# Patient Record
Sex: FEMALE | NOT HISPANIC OR LATINO | Employment: OTHER | URBAN - METROPOLITAN AREA
[De-identification: names, ages, dates, MRNs, and addresses within clinical notes are randomized per-mention and may not be internally consistent; named-entity substitution may affect disease eponyms.]

---

## 2024-08-15 ENCOUNTER — TELEPHONE (OUTPATIENT)
Dept: UROLOGY | Facility: CLINIC | Age: 84
End: 2024-08-15
Payer: MEDICARE

## 2024-08-15 NOTE — TELEPHONE ENCOUNTER
.Outgoing call placed to patient, patient verified name and date of birth, patient notified that she is scheduled for the soonest appt with Dr. Lipscomb but if she is interested she can see our NP or another provider sooner, she stated she will contact her daughter and see what she says. I contacted the below daughter and she was notified of the options and stated she will talk with her mom and contact us back.      ----- Message from Natalie Myers sent at 8/15/2024 10:04 AM CDT -----  Contact: Debbie  Type:  Sooner Appointment Request    Caller is requesting a sooner appointment.  Caller declined first available appointment listed below.  Caller will not accept being placed on the waitlist and is requesting a message be sent to doctor.    Name of Caller:  Debbie/ Daughter/ Caregiver  When is the first available appointment?  9/30  Symptoms:  Symptom: Urination Pain  Outcome: Schedule a same-day appointment or talk to a nurse or provider within 1 hour.  Reason: Caller denied all higher acuity questions    The caller rejected this outcome  Would the patient rather a call back or a response via [facility]? Call back  Best Call Back Number:  [phone]  Additional Information:  Would like to be seen in [facility]

## 2024-08-24 ENCOUNTER — OFFICE VISIT (OUTPATIENT)
Dept: UROLOGY | Facility: CLINIC | Age: 84
End: 2024-08-24
Payer: MEDICARE

## 2024-08-24 VITALS
HEART RATE: 66 BPM | WEIGHT: 132.06 LBS | SYSTOLIC BLOOD PRESSURE: 151 MMHG | RESPIRATION RATE: 18 BRPM | DIASTOLIC BLOOD PRESSURE: 64 MMHG

## 2024-08-24 DIAGNOSIS — R30.0 DYSURIA: Primary | ICD-10-CM

## 2024-08-24 DIAGNOSIS — R35.1 NOCTURIA: ICD-10-CM

## 2024-08-24 DIAGNOSIS — N39.0 RECURRENT UTI: ICD-10-CM

## 2024-08-24 DIAGNOSIS — N95.8 GENITOURINARY SYNDROME OF MENOPAUSE: ICD-10-CM

## 2024-08-24 LAB
BILIRUB UR QL STRIP: NEGATIVE
GLUCOSE UR QL STRIP: NEGATIVE
KETONES UR QL STRIP: NEGATIVE
LEUKOCYTE ESTERASE UR QL STRIP: POSITIVE
PH, POC UA: 6.5
POC BLOOD, URINE: NEGATIVE
POC NITRATES, URINE: POSITIVE
PROT UR QL STRIP: NEGATIVE
SP GR UR STRIP: 1.02 (ref 1–1.03)
UROBILINOGEN UR STRIP-ACNC: 0.2 (ref 0.1–1.1)

## 2024-08-24 PROCEDURE — 81003 URINALYSIS AUTO W/O SCOPE: CPT | Mod: QW,S$GLB,, | Performed by: UROLOGY

## 2024-08-24 PROCEDURE — 1101F PT FALLS ASSESS-DOCD LE1/YR: CPT | Mod: CPTII,S$GLB,, | Performed by: UROLOGY

## 2024-08-24 PROCEDURE — 1159F MED LIST DOCD IN RCRD: CPT | Mod: CPTII,S$GLB,, | Performed by: UROLOGY

## 2024-08-24 PROCEDURE — 1126F AMNT PAIN NOTED NONE PRSNT: CPT | Mod: CPTII,S$GLB,, | Performed by: UROLOGY

## 2024-08-24 PROCEDURE — 3288F FALL RISK ASSESSMENT DOCD: CPT | Mod: CPTII,S$GLB,, | Performed by: UROLOGY

## 2024-08-24 PROCEDURE — 87086 URINE CULTURE/COLONY COUNT: CPT | Performed by: UROLOGY

## 2024-08-24 PROCEDURE — 99204 OFFICE O/P NEW MOD 45 MIN: CPT | Mod: S$GLB,,, | Performed by: UROLOGY

## 2024-08-24 PROCEDURE — 87186 SC STD MICRODIL/AGAR DIL: CPT | Performed by: UROLOGY

## 2024-08-24 PROCEDURE — 99999 PR PBB SHADOW E&M-EST. PATIENT-LVL IV: CPT | Mod: PBBFAC,,, | Performed by: UROLOGY

## 2024-08-24 PROCEDURE — 87077 CULTURE AEROBIC IDENTIFY: CPT | Performed by: UROLOGY

## 2024-08-24 PROCEDURE — 87088 URINE BACTERIA CULTURE: CPT | Performed by: UROLOGY

## 2024-08-24 RX ORDER — FENOFIBRATE 160 MG/1
1 TABLET ORAL DAILY
COMMUNITY
Start: 2023-10-23

## 2024-08-24 RX ORDER — AMLODIPINE BESYLATE 10 MG/1
1 TABLET ORAL DAILY
COMMUNITY
Start: 2024-04-18

## 2024-08-24 RX ORDER — LOSARTAN POTASSIUM 100 MG/1
1 TABLET ORAL DAILY
COMMUNITY
Start: 2024-05-28

## 2024-08-24 RX ORDER — BUSPIRONE HYDROCHLORIDE 5 MG/1
5 TABLET ORAL
COMMUNITY
Start: 2023-10-10

## 2024-08-24 RX ORDER — NITROFURANTOIN 25; 75 MG/1; MG/1
CAPSULE ORAL
COMMUNITY
Start: 2024-08-16

## 2024-08-24 RX ORDER — ASCORBIC ACID 125 MG
TABLET,CHEWABLE ORAL
COMMUNITY

## 2024-08-24 RX ORDER — CEFDINIR 300 MG/1
300 CAPSULE ORAL 2 TIMES DAILY
Qty: 14 CAPSULE | Refills: 0 | Status: SHIPPED | OUTPATIENT
Start: 2024-08-24 | End: 2024-08-31

## 2024-08-24 RX ORDER — ASPIRIN 81 MG/1
1 TABLET ORAL EVERY MORNING
COMMUNITY

## 2024-08-24 RX ORDER — METOPROLOL SUCCINATE 25 MG/1
1 TABLET, EXTENDED RELEASE ORAL EVERY MORNING
COMMUNITY
Start: 2024-04-18

## 2024-08-24 NOTE — PROGRESS NOTES
Chief Complaint   Patient presents with    Dysuria       Referring Provider: Dr. Anum Shirley      History of Present Illness:   Windy Mariano is a 84 y.o. female here for evaluation of Dysuria  .   [birth year]/24-85yo female presents for evaluation of recurrent UTI and nocturia. She reports that she has nocturia x 10-17 times a night. She denies daytime frequency. She reports that she has UTIs about 4 or more times this past year. There are multiple E. Coli cultures on file. Nocturia has also been ongoing for about a year. She hasn't taken any oab meds. She just finished 5 days of macrobid 5 days ago. She does report having some right sided flank pain. She denies snoring.       Review of Systems   Constitutional:  Negative for chills and fever.   Respiratory:  Negative for shortness of breath.    Cardiovascular:  Negative for chest pain and leg swelling.   Genitourinary:  Positive for flank pain.   All other systems reviewed and are negative.        Past Medical History:   Diagnosis Date    Allergy     Hypertension        Past Surgical History:   Procedure Laterality Date    AORTIC VALVE REPLACEMENT      HYSTERECTOMY         Family History   Problem Relation Name Age of Onset    Hypertension Mother         Social History     Tobacco Use    Smoking status: Never    Smokeless tobacco: Never   Substance Use Topics    Alcohol use: Never    Drug use: Never       Current Outpatient Medications   Medication Sig Dispense Refill    amLODIPine (NORVASC) 10 MG tablet Take 1 tablet by mouth once daily.      aspirin (ECOTRIN) 81 MG EC tablet Take 1 tablet by mouth every morning.      busPIRone (BUSPAR) 5 MG Tab Take 5 mg by mouth.      cholecalciferol, vitamin D3, 25 mcg (1,000 unit) Chew Take by mouth.      fenofibrate 160 MG Tab Take 1 tablet by mouth once daily.      losartan (COZAAR) 100 MG tablet Take 1 tablet by mouth once daily.      metoprolol succinate (TOPROL-XL) 25 MG 24 hr tablet Take 1 tablet by mouth every  morning.      nitrofurantoin, macrocrystal-monohydrate, (MACROBID) 100 MG capsule SMARTSI Capsule(s) By Mouth Morning-Night      cefdinir (OMNICEF) 300 MG capsule Take 1 capsule (300 mg total) by mouth 2 (two) times daily. for 7 days 14 capsule 0     No current facility-administered medications for this visit.       Review of patient's allergies indicates:   Allergen Reactions    Sulfa (sulfonamide antibiotics)        Physical Exam  Vitals:    24 1011   BP: (!) 151/64   Pulse: 66   Resp: 18     General: Well-developed, well-nourished, in no acute distress  HEENT: Normocephalic, atraumatic, extraocular movements intact  Neck: Supple, no supraclavicular or cervical lymphadenopathy, trachea midline  Respirations: even and unlabored  Back: midline spine, No CVA tenderness  Abdomen: soft, Non-tender, non-distended, no palpable masses, no rebound or guarding  : Normal external female genitalia without lesions. Orthotopic urethral meatus. atrophic vaginal mucosa. Grade 2 Cysotcele, no Rectocele, negative cough stress test, + urethral hypermobility  Extremities: moves all equally, no clubbing, cyanosis or edema  Skin: Warm and dry. No lesions  Psych: normal affect  Neuro: Alert and oriented x 3. Cranial nerves II-XII intact      Urinalysis  Nit+, trace LE    Assessment:  1. Dysuria  POCT Urinalysis, Dipstick, Automated, W/O Scope      2. Nocturia        3. Recurrent UTI  US Retroperitoneal Complete    Urine culture      4. Genitourinary syndrome of menopause              Plan:   Dysuria  -     POCT Urinalysis, Dipstick, Automated, W/O Scope    Nocturia    Recurrent UTI  -     US Retroperitoneal Complete; Future; Expected date: 2024  -     Urine culture    Genitourinary syndrome of menopause    Other orders  -     cefdinir (OMNICEF) 300 MG capsule; Take 1 capsule (300 mg total) by mouth 2 (two) times daily. for 7 days  Dispense: 14 capsule; Refill: 0      Discussed vaginal estrogen. Pt to hold off until  after workup.     Follow up for Cystoscopy.

## 2024-08-28 LAB — BACTERIA UR CULT: ABNORMAL

## 2024-09-09 ENCOUNTER — OFFICE VISIT (OUTPATIENT)
Dept: UROLOGY | Facility: CLINIC | Age: 84
End: 2024-09-09
Payer: MEDICARE

## 2024-09-09 ENCOUNTER — PATIENT MESSAGE (OUTPATIENT)
Dept: UROLOGY | Facility: CLINIC | Age: 84
End: 2024-09-09

## 2024-09-09 VITALS
WEIGHT: 128.75 LBS | DIASTOLIC BLOOD PRESSURE: 62 MMHG | HEART RATE: 69 BPM | BODY MASS INDEX: 25.28 KG/M2 | TEMPERATURE: 98 F | HEIGHT: 60 IN | SYSTOLIC BLOOD PRESSURE: 153 MMHG | RESPIRATION RATE: 18 BRPM

## 2024-09-09 DIAGNOSIS — R30.0 DYSURIA: Primary | ICD-10-CM

## 2024-09-09 DIAGNOSIS — N95.8 GENITOURINARY SYNDROME OF MENOPAUSE: ICD-10-CM

## 2024-09-09 DIAGNOSIS — K59.09 CHRONIC CONSTIPATION: ICD-10-CM

## 2024-09-09 DIAGNOSIS — N39.0 RECURRENT UTI: ICD-10-CM

## 2024-09-09 LAB
BILIRUB UR QL STRIP: POSITIVE
GLUCOSE UR QL STRIP: POSITIVE
KETONES UR QL STRIP: POSITIVE
LEUKOCYTE ESTERASE UR QL STRIP: POSITIVE
PH, POC UA: 5
POC BLOOD, URINE: NEGATIVE
POC NITRATES, URINE: POSITIVE
POC RESIDUAL URINE VOLUME: 21 ML (ref 0–100)
PROT UR QL STRIP: POSITIVE
SP GR UR STRIP: 1.02 (ref 1–1.03)
UROBILINOGEN UR STRIP-ACNC: 2 (ref 0.1–1.1)

## 2024-09-09 PROCEDURE — 99214 OFFICE O/P EST MOD 30 MIN: CPT | Mod: S$GLB,,, | Performed by: UROLOGY

## 2024-09-09 PROCEDURE — 99999 PR PBB SHADOW E&M-EST. PATIENT-LVL IV: CPT | Mod: PBBFAC,,, | Performed by: UROLOGY

## 2024-09-09 PROCEDURE — 1101F PT FALLS ASSESS-DOCD LE1/YR: CPT | Mod: CPTII,S$GLB,, | Performed by: UROLOGY

## 2024-09-09 PROCEDURE — 3288F FALL RISK ASSESSMENT DOCD: CPT | Mod: CPTII,S$GLB,, | Performed by: UROLOGY

## 2024-09-09 PROCEDURE — 87186 SC STD MICRODIL/AGAR DIL: CPT | Performed by: UROLOGY

## 2024-09-09 PROCEDURE — 87086 URINE CULTURE/COLONY COUNT: CPT | Performed by: UROLOGY

## 2024-09-09 PROCEDURE — 1126F AMNT PAIN NOTED NONE PRSNT: CPT | Mod: CPTII,S$GLB,, | Performed by: UROLOGY

## 2024-09-09 PROCEDURE — 51798 US URINE CAPACITY MEASURE: CPT | Mod: S$GLB,,, | Performed by: UROLOGY

## 2024-09-09 PROCEDURE — 81003 URINALYSIS AUTO W/O SCOPE: CPT | Mod: QW,S$GLB,, | Performed by: UROLOGY

## 2024-09-09 PROCEDURE — 1159F MED LIST DOCD IN RCRD: CPT | Mod: CPTII,S$GLB,, | Performed by: UROLOGY

## 2024-09-09 PROCEDURE — 87088 URINE BACTERIA CULTURE: CPT | Performed by: UROLOGY

## 2024-09-09 RX ORDER — ESTRADIOL 0.1 MG/G
CREAM VAGINAL
Qty: 42.5 G | Refills: 3 | Status: SHIPPED | OUTPATIENT
Start: 2024-09-09 | End: 2025-09-11

## 2024-09-09 RX ORDER — NITROFURANTOIN 25; 75 MG/1; MG/1
100 CAPSULE ORAL 2 TIMES DAILY
Qty: 14 CAPSULE | Refills: 0 | Status: SHIPPED | OUTPATIENT
Start: 2024-09-09 | End: 2024-09-12

## 2024-09-09 NOTE — PROGRESS NOTES
Chief Complaint   Patient presents with    Urinary Tract Infection       History of Present Illness:   Windy Mariano is a 84 y.o. female here for evaluation of Urinary Tract Infection  .   9/9/24-Pt completed cefdinir for UTI about 10 days ago. She did have improvement and only had nocturia x 6-7. Symptoms restarted about 3 days ago, she started on azo. Last night, she had nocturia x 13. She reports that prior to starting the azo, she was having dysuria and pressure that she reports radiates up the sides of her abdomen. No fever. Occasional right flank pain. Pt reports constipation. Unsure if she started Miralax. No gross hematuria.     8/24/24-83yo female presents for evaluation of recurrent UTI and nocturia. She reports that she has nocturia x 10-17 times a night. She denies daytime frequency. She reports that she has UTIs about 4 or more times this past year. There are multiple E. Coli cultures on file. Nocturia has also been ongoing for about a year. She hasn't taken any oab meds. She just finished 5 days of macrobid 5 days ago. She does report having some right sided flank pain. She denies snoring.       Review of Systems   Constitutional:  Negative for chills and fever.   Respiratory:  Negative for shortness of breath.    Cardiovascular:  Negative for chest pain and leg swelling.   Gastrointestinal:  Positive for constipation.   Genitourinary:  Positive for flank pain.   Musculoskeletal:  Positive for arthralgias.   All other systems reviewed and are negative.        Past Medical History:   Diagnosis Date    Allergy     Hypertension        Past Surgical History:   Procedure Laterality Date    AORTIC VALVE REPLACEMENT      HYSTERECTOMY         Family History   Problem Relation Name Age of Onset    Hypertension Mother         Social History     Tobacco Use    Smoking status: Never    Smokeless tobacco: Never   Substance Use Topics    Alcohol use: Never    Drug use: Never       Current Outpatient Medications    Medication Sig Dispense Refill    amLODIPine (NORVASC) 10 MG tablet Take 1 tablet by mouth once daily.      aspirin (ECOTRIN) 81 MG EC tablet Take 1 tablet by mouth every morning.      busPIRone (BUSPAR) 5 MG Tab Take 5 mg by mouth.      cholecalciferol, vitamin D3, 25 mcg (1,000 unit) Chew Take by mouth.      fenofibrate 160 MG Tab Take 1 tablet by mouth once daily.      losartan (COZAAR) 100 MG tablet Take 1 tablet by mouth once daily.      metoprolol succinate (TOPROL-XL) 25 MG 24 hr tablet Take 1 tablet by mouth every morning.      nitrofurantoin, macrocrystal-monohydrate, (MACROBID) 100 MG capsule SMARTSI Capsule(s) By Mouth Morning-Night      estradioL (ESTRACE) 0.01 % (0.1 mg/gram) vaginal cream Place 1 g vaginally once daily for 7 days, THEN 1 g twice a week. 42.5 g 3    nitrofurantoin, macrocrystal-monohydrate, (MACROBID) 100 MG capsule Take 1 capsule (100 mg total) by mouth 2 (two) times daily. 14 capsule 0     No current facility-administered medications for this visit.       Review of patient's allergies indicates:   Allergen Reactions    Sulfa (sulfonamide antibiotics)        Physical Exam  Vitals:    24 1104   BP: (!) 153/62   Pulse: 69   Resp: 18   Temp: 98.1 °F (36.7 °C)     General: Well-developed, well-nourished, in no acute distress  HEENT: Normocephalic, atraumatic, extraocular movements intact  Neck: Supple, no supraclavicular or cervical lymphadenopathy, trachea midline  Respirations: even and unlabored  Back: midline spine, No CVA tenderness  Abdomen: soft, Non-tender, non-distended, no palpable masses, no rebound or guarding  : 24-Normal external female genitalia without lesions. Orthotopic urethral meatus. atrophic vaginal mucosa. Grade 2 Cysotcele, no Rectocele, negative cough stress test, + urethral hypermobility  Extremities: moves all equally, no clubbing, cyanosis or edema  Skin: Warm and dry. No lesions  Psych: normal affect  Neuro: Alert and oriented x 3. Cranial  nerves II-XII intact      Urinalysis  Pyridium urine    Assessment:  1. Dysuria  POCT Urinalysis, Dipstick, Automated, W/O Scope    POCT Bladder Scan    Urine culture      2. Recurrent UTI        3. Genitourinary syndrome of menopause        4. Chronic constipation              Plan:   Dysuria  -     POCT Urinalysis, Dipstick, Automated, W/O Scope  -     POCT Bladder Scan  -     Urine culture    Recurrent UTI    Genitourinary syndrome of menopause    Chronic constipation    Other orders  -     estradioL (ESTRACE) 0.01 % (0.1 mg/gram) vaginal cream; Place 1 g vaginally once daily for 7 days, THEN 1 g twice a week.  Dispense: 42.5 g; Refill: 3  -     nitrofurantoin, macrocrystal-monohydrate, (MACROBID) 100 MG capsule; Take 1 capsule (100 mg total) by mouth 2 (two) times daily.  Dispense: 14 capsule; Refill: 0      Encouraged use of MiraLax.     Follow up for Cystoscopy.

## 2024-09-12 ENCOUNTER — TELEPHONE (OUTPATIENT)
Dept: UROLOGY | Facility: CLINIC | Age: 84
End: 2024-09-12
Payer: MEDICARE

## 2024-09-12 RX ORDER — LEVOFLOXACIN 500 MG/1
500 TABLET, FILM COATED ORAL DAILY
Qty: 7 TABLET | Refills: 0 | Status: SHIPPED | OUTPATIENT
Start: 2024-09-12

## 2024-09-12 NOTE — TELEPHONE ENCOUNTER
.Outgoing call placed to patient, patient verified name and date of birth, patient notified of below slowly and so she is able to understand, she verbalized stopping the current antibiotic and getting her daughter to  the new one from the pharmacy, no further assistance.       ----- Message from Lizabeth Lipscomb MD sent at 9/12/2024 10:05 AM CDT -----  Notify pt based on the preliminary culture, macrobid probably won't work. Levaquin sent to her pharmacy to take instead.

## 2024-09-12 NOTE — TELEPHONE ENCOUNTER
.Outgoing call placed to patient's daughter, she verified patient's name and date of birth, she stated patient was concerned about whether this medication would be an issue since she has aortic valve stenosis, message sent to MD with patient's concerns.      ----- Message from Yoandy Ford sent at 9/12/2024  3:41 PM CDT -----  Contact: Windy  Type:  Needs Medical Advice    Who Called: Windy  Symptoms (please be specific): questions about the levoFLOXacin (LEVAQUIN) 500 MG tablet medication  Would the patient rather a call back or a response via [facility]? call  Best Call Back Number: [phone]   Additional Information:

## 2024-09-12 NOTE — TELEPHONE ENCOUNTER
Spoke with patient's daughter who was notified of below per Dr. Lipscomb, she verbalized understanding and will speak with her mom about it, if she has any other questions/concerns she will contact us. Encouraged for her and mom to monitor of adverse effects and stop medication immediately if needed    ----- Message from Lizabeth Lipscomb MD sent at 9/12/2024  4:28 PM CDT -----  Contact: Windy  If she wants to wait, she can, but with the type of bacteria that is growing, that may be the only oral option. We do have to exercise caution because there are potential side effects, so if she would like to shorten the course to 5 days, that will probably be okay.  ----- Message -----  From: Rickey Winters RN  Sent: 9/12/2024   3:59 PM CDT  To: Lizabeth Lipscomb MD    Patient and daughter notified of antibiotic change and wanted to let you know that she has aortic valve stenosis and wasn't sure if the Levofloxacin would be an issue for her after reading side effects.  ----- Message -----  From: Yoandy Ford  Sent: 9/12/2024   3:42 PM CDT  To: [facility]    Type:  Needs Medical Advice    Who Called: Windy  Symptoms (please be specific): questions about the levoFLOXacin (LEVAQUIN) 500 MG tablet medication  Would the patient rather a call back or a response via [facility]? call  Best Call Back Number: [phone]   Additional Information:

## 2024-09-13 LAB — BACTERIA UR CULT: ABNORMAL

## 2024-09-20 ENCOUNTER — HOSPITAL ENCOUNTER (OUTPATIENT)
Dept: RADIOLOGY | Facility: HOSPITAL | Age: 84
Discharge: HOME | End: 2024-09-20
Attending: UROLOGY
Payer: MEDICARE

## 2024-09-20 DIAGNOSIS — N39.0 RECURRENT UTI: ICD-10-CM

## 2024-09-20 PROCEDURE — 76770 US EXAM ABDO BACK WALL COMP: CPT | Mod: 26,,, | Performed by: RADIOLOGY

## 2024-09-20 PROCEDURE — 76770 US EXAM ABDO BACK WALL COMP: CPT | Mod: TC

## 2024-09-23 ENCOUNTER — TELEPHONE (OUTPATIENT)
Dept: UROLOGY | Facility: CLINIC | Age: 84
End: 2024-09-23

## 2024-09-23 ENCOUNTER — PATIENT MESSAGE (OUTPATIENT)
Dept: UROLOGY | Facility: CLINIC | Age: 84
End: 2024-09-23

## 2024-09-23 ENCOUNTER — PROCEDURE VISIT (OUTPATIENT)
Dept: UROLOGY | Facility: CLINIC | Age: 84
End: 2024-09-23
Payer: MEDICARE

## 2024-09-23 DIAGNOSIS — R35.1 NOCTURIA: ICD-10-CM

## 2024-09-23 DIAGNOSIS — N39.0 RECURRENT UTI: Primary | ICD-10-CM

## 2024-09-23 DIAGNOSIS — N30.80 CYSTITIS CYSTICA: ICD-10-CM

## 2024-09-23 LAB
BILIRUB UR QL STRIP: NEGATIVE
GLUCOSE UR QL STRIP: NEGATIVE
KETONES UR QL STRIP: POSITIVE
LEUKOCYTE ESTERASE UR QL STRIP: NEGATIVE
PH, POC UA: 5.5
POC BLOOD, URINE: NEGATIVE
POC NITRATES, URINE: NEGATIVE
PROT UR QL STRIP: NEGATIVE
SP GR UR STRIP: 1.02 (ref 1–1.03)
UROBILINOGEN UR STRIP-ACNC: 0.2 (ref 0.1–1.1)

## 2024-09-23 PROCEDURE — 81003 URINALYSIS AUTO W/O SCOPE: CPT | Mod: QW,S$GLB,, | Performed by: UROLOGY

## 2024-09-23 PROCEDURE — 52000 CYSTOURETHROSCOPY: CPT | Mod: S$GLB,,, | Performed by: UROLOGY

## 2024-09-23 RX ORDER — NITROFURANTOIN 25; 75 MG/1; MG/1
100 CAPSULE ORAL DAILY
Qty: 30 CAPSULE | Refills: 2 | Status: SHIPPED | OUTPATIENT
Start: 2024-09-23

## 2024-09-23 RX ORDER — LIDOCAINE HYDROCHLORIDE 20 MG/ML
JELLY TOPICAL
Status: COMPLETED | OUTPATIENT
Start: 2024-09-23 | End: 2024-09-23

## 2024-09-23 RX ORDER — CIPROFLOXACIN 500 MG/1
500 TABLET ORAL
Status: COMPLETED | OUTPATIENT
Start: 2024-09-23 | End: 2024-09-23

## 2024-09-23 RX ADMIN — CIPROFLOXACIN 500 MG: 500 TABLET ORAL at 10:09

## 2024-09-23 RX ADMIN — LIDOCAINE HYDROCHLORIDE 11 ML: 20 JELLY TOPICAL at 10:09

## 2024-09-23 NOTE — PROCEDURES
CC: follow up recurrent UTI    History of Present Illness:   Windy Mariano is a 84 y.o. female here for follow up.     9/23/24-Here for cysto. Pt uses estrace cream. Renal US showed simple renal cysts only. Completed levaquin for most recent UTI. She continues to have significant nocturia. No snoring.    9/9/24-Pt completed cefdinir for UTI about 10 days ago. She did have improvement and only had nocturia x 6-7. Symptoms restarted about 3 days ago, she started on azo. Last night, she had nocturia x 13. She reports that prior to starting the azo, she was having dysuria and pressure that she reports radiates up the sides of her abdomen. No fever. Occasional right flank pain. Pt reports constipation. Unsure if she started Miralax. No gross hematuria.     8/24/24-83yo female presents for evaluation of recurrent UTI and nocturia. She reports that she has nocturia x 10-17 times a night. She denies daytime frequency. She reports that she has UTIs about 4 or more times this past year. There are multiple E. Coli cultures on file. Nocturia has also been ongoing for about a year. She hasn't taken any oab meds. She just finished 5 days of macrobid 5 days ago. She does report having some right sided flank pain. She denies snoring.       Review of Systems   Constitutional:  Negative for chills and fever.   Respiratory:  Negative for shortness of breath.    Cardiovascular:  Negative for chest pain and leg swelling.   Gastrointestinal:  Positive for constipation.   Genitourinary:  Positive for flank pain.   Musculoskeletal:  Positive for arthralgias.   All other systems reviewed and are negative.        Past Medical History:   Diagnosis Date    Allergy     Hypertension        Past Surgical History:   Procedure Laterality Date    AORTIC VALVE REPLACEMENT      HYSTERECTOMY         Family History   Problem Relation Name Age of Onset    Hypertension Mother         Social History     Tobacco Use    Smoking status: Never    Smokeless  tobacco: Never   Substance Use Topics    Alcohol use: Never    Drug use: Never       Current Outpatient Medications   Medication Sig Dispense Refill    amLODIPine (NORVASC) 10 MG tablet Take 1 tablet by mouth once daily.      aspirin (ECOTRIN) 81 MG EC tablet Take 1 tablet by mouth every morning.      busPIRone (BUSPAR) 5 MG Tab Take 5 mg by mouth.      cholecalciferol, vitamin D3, 25 mcg (1,000 unit) Chew Take by mouth.      estradioL (ESTRACE) 0.01 % (0.1 mg/gram) vaginal cream Place 1 g vaginally once daily for 7 days, THEN 1 g twice a week. 42.5 g 3    fenofibrate 160 MG Tab Take 1 tablet by mouth once daily.      levoFLOXacin (LEVAQUIN) 500 MG tablet Take 1 tablet (500 mg total) by mouth once daily. 7 tablet 0    losartan (COZAAR) 100 MG tablet Take 1 tablet by mouth once daily.      metoprolol succinate (TOPROL-XL) 25 MG 24 hr tablet Take 1 tablet by mouth every morning.      nitrofurantoin, macrocrystal-monohydrate, (MACROBID) 100 MG capsule Take 1 capsule (100 mg total) by mouth Daily. 30 capsule 2     No current facility-administered medications for this visit.       Review of patient's allergies indicates:   Allergen Reactions    Sulfa (sulfonamide antibiotics)        Physical Exam  There were no vitals filed for this visit.    General: Well-developed, well-nourished, in no acute distress  HEENT: Normocephalic, atraumatic, extraocular movements intact  Neck: Supple, no supraclavicular or cervical lymphadenopathy, trachea midline  Respirations: even and unlabored  Back: midline spine, No CVA tenderness  Abdomen: soft, Non-tender, non-distended, no palpable masses, no rebound or guarding  : 8/24/24-Normal external female genitalia without lesions. Orthotopic urethral meatus. atrophic vaginal mucosa. Grade 2 Cysotcele, no Rectocele, negative cough stress test, + urethral hypermobility  Extremities: moves all equally, no clubbing, cyanosis, 1+ LE edema  Skin: Warm and dry. No lesions  Psych: normal  affect  Neuro: Alert and oriented x 3. Cranial nerves II-XII intact      Urinalysis  Negative for blood, LE, nit    Procedure: Cystoscopy      Procedure in detail:   Informed consent was obtained. The patient's genitalia was prepped and draped in the usual sterile fashion. Lidocaine jelly was administered per urethra. I utilized the flexible cystoscope and advanced it into the urethral meatus. Bladder access was obtained. Systematic review of the bladder was performed, noting no stones, foreign bodies or masses. She did have evidence of cystitis cystica on her posterior bladder wall. Bilateral ureteral orifices were visualized and noted to be effluxing clear urine. Retroflexion was utilized to visualize the bladder neck, noting no lesions. The scope was slowly backed out of the urethra, noting no urethral lesions. The patient tolerated the procedure well. Estimated blood loss was 0cc.         Assessment:  1. Recurrent UTI  POCT Urinalysis, Dipstick, Automated, W/O Scope      2. Cystitis cystica        3. Nocturia              Plan:   Recurrent UTI  -     POCT Urinalysis, Dipstick, Automated, W/O Scope    Cystitis cystica    Nocturia    Other orders  -     ciprofloxacin HCl tablet 500 mg  -     LIDOcaine HCl 2% urojet  -     nitrofurantoin, macrocrystal-monohydrate, (MACROBID) 100 MG capsule; Take 1 capsule (100 mg total) by mouth Daily.  Dispense: 30 capsule; Refill: 2    Continue vaginal estrogen  Encouraged use of MiraLax.   Elevate lower extremities  Briefly discussed OAB meds, but she would like to hold off  Limit pm fluid, especially tea  Follow up in about 3 months (around 12/23/2024).

## 2024-09-23 NOTE — PROGRESS NOTES
..Per Dr. Jean Baptiste oral ciprofloxacin 500 MG was given to pt. Pt instructed to remain in clinic for 15 minutes to monitor for signs & symptoms of adverse reaction. Pt verbalized understanding.      Loraine Milanes RN

## 2024-09-24 ENCOUNTER — TELEPHONE (OUTPATIENT)
Dept: UROLOGY | Facility: CLINIC | Age: 84
End: 2024-09-24
Payer: MEDICARE

## 2024-09-24 NOTE — TELEPHONE ENCOUNTER
Spoke with patient's daughter and confirmed what medication was prescribed as requested and notified by MD, no further assistance needed.     ----- Message from Lizabeth Lipscomb MD sent at 9/23/2024  5:11 PM CDT -----  Contact: Self [phone]  Yes, she did take it previously, but I want her to take it daily for 3 months. It should help with the recurrent issue.  ----- Message -----  From: Milanes Flores, Loraine, BURAK  Sent: 9/23/2024   4:56 PM CDT  To: Lizabeth Lipscomb MD    Please advise, she finished nitrofurantoin on 09/12.   Thanks`  ----- Message -----  From: Mae Perkins  Sent: 9/23/2024   1:21 PM CDT  To: [facility]    Would like to receive medical advice.    Would they like a call back or a response via [facility]:  call back    Additional information:  Calling to speak with the office about her having the same meds that she was given previously at home already and would like to talk about those bc she has stopped taking them. Please call back to advise.

## 2024-09-30 ENCOUNTER — TELEPHONE (OUTPATIENT)
Dept: UROLOGY | Facility: CLINIC | Age: 84
End: 2024-09-30
Payer: MEDICARE

## 2024-09-30 NOTE — TELEPHONE ENCOUNTER
Spoke with patient who was able to provide acceptable patient identifiers prior to start of conversation. Patient states since taking antibiotic Macrobid in tbe morning it's possibly causing light headedness. Patient wants to know can she take medication at bedtime instead. Will notify Dr. Lipscomb reference to patient concern.

## 2024-09-30 NOTE — TELEPHONE ENCOUNTER
----- Message from [facility] sent at 9/30/2024 12:07 PM CDT -----  The pt is needing a call back in regards to some medications she is on. She has some concerns. Please give a call back at [phone]/  [phone]

## 2024-09-30 NOTE — TELEPHONE ENCOUNTER
Spoke with patient who was able to provide acceptable patient identifiers prior to start of conversation. Patient notified that it is okay to take medication Macrobid at bedtime. Patient verbalized understanding.

## 2024-10-07 ENCOUNTER — PATIENT MESSAGE (OUTPATIENT)
Dept: RESEARCH | Facility: HOSPITAL | Age: 84
End: 2024-10-07
Payer: MEDICARE

## 2024-10-22 ENCOUNTER — PATIENT MESSAGE (OUTPATIENT)
Dept: RESEARCH | Facility: HOSPITAL | Age: 84
End: 2024-10-22
Payer: MEDICARE

## 2025-01-06 ENCOUNTER — OFFICE VISIT (OUTPATIENT)
Dept: UROLOGY | Facility: CLINIC | Age: 85
End: 2025-01-06
Payer: MEDICARE

## 2025-01-06 VITALS
BODY MASS INDEX: 25.11 KG/M2 | SYSTOLIC BLOOD PRESSURE: 152 MMHG | OXYGEN SATURATION: 99 % | WEIGHT: 127.88 LBS | RESPIRATION RATE: 18 BRPM | HEIGHT: 60 IN | HEART RATE: 61 BPM | DIASTOLIC BLOOD PRESSURE: 72 MMHG

## 2025-01-06 DIAGNOSIS — R35.1 NOCTURIA: ICD-10-CM

## 2025-01-06 DIAGNOSIS — N95.8 GENITOURINARY SYNDROME OF MENOPAUSE: ICD-10-CM

## 2025-01-06 DIAGNOSIS — N39.0 RECURRENT UTI: Primary | ICD-10-CM

## 2025-01-06 PROCEDURE — 1159F MED LIST DOCD IN RCRD: CPT | Mod: CPTII,S$GLB,, | Performed by: UROLOGY

## 2025-01-06 PROCEDURE — 1101F PT FALLS ASSESS-DOCD LE1/YR: CPT | Mod: CPTII,S$GLB,, | Performed by: UROLOGY

## 2025-01-06 PROCEDURE — 1126F AMNT PAIN NOTED NONE PRSNT: CPT | Mod: CPTII,S$GLB,, | Performed by: UROLOGY

## 2025-01-06 PROCEDURE — 3288F FALL RISK ASSESSMENT DOCD: CPT | Mod: CPTII,S$GLB,, | Performed by: UROLOGY

## 2025-01-06 PROCEDURE — 99999 PR PBB SHADOW E&M-EST. PATIENT-LVL III: CPT | Mod: PBBFAC,,, | Performed by: UROLOGY

## 2025-01-06 PROCEDURE — 99214 OFFICE O/P EST MOD 30 MIN: CPT | Mod: S$GLB,,, | Performed by: UROLOGY

## 2025-01-06 RX ORDER — OLMESARTAN MEDOXOMIL 40 MG/1
40 TABLET ORAL DAILY
COMMUNITY
Start: 2025-01-03 | End: 2025-07-02

## 2025-01-06 RX ORDER — ESTRADIOL 0.1 MG/G
CREAM VAGINAL
Qty: 42.5 G | Refills: 3 | Status: SHIPPED | OUTPATIENT
Start: 2025-01-06 | End: 2026-01-08

## 2025-01-06 NOTE — PROGRESS NOTES
CC: follow up recurrent UTI    History of Present Illness:   Windy Mariano is a 84 y.o. female here for follow up.     1/6/25-Pt completed 3 months of macrobid. She thinks she is doing better. She is having nocturia x 3. No gross hematuria or dysuria. No urge incontinence. Feels like she is emptying. Using vag est infrequently.     9/23/24-Here for cysto. Pt uses estrace cream. Renal US showed simple renal cysts only. Completed levaquin for most recent UTI. She continues to have significant nocturia. No snoring.    9/9/24-Pt completed cefdinir for UTI about 10 days ago. She did have improvement and only had nocturia x 6-7. Symptoms restarted about 3 days ago, she started on azo. Last night, she had nocturia x 13. She reports that prior to starting the azo, she was having dysuria and pressure that she reports radiates up the sides of her abdomen. No fever. Occasional right flank pain. Pt reports constipation. Unsure if she started Miralax. No gross hematuria.     8/24/24-83yo female presents for evaluation of recurrent UTI and nocturia. She reports that she has nocturia x 10-17 times a night. She denies daytime frequency. She reports that she has UTIs about 4 or more times this past year. There are multiple E. Coli cultures on file. Nocturia has also been ongoing for about a year. She hasn't taken any oab meds. She just finished 5 days of macrobid 5 days ago. She does report having some right sided flank pain. She denies snoring.       Review of Systems   Constitutional:  Negative for chills and fever.   Respiratory:  Negative for shortness of breath.    Cardiovascular:  Negative for chest pain and leg swelling.   All other systems reviewed and are negative.        Past Medical History:   Diagnosis Date    Allergy     Hypertension        Past Surgical History:   Procedure Laterality Date    AORTIC VALVE REPLACEMENT      HYSTERECTOMY         Family History   Problem Relation Name Age of Onset    Hypertension Mother          Social History     Tobacco Use    Smoking status: Never    Smokeless tobacco: Never   Substance Use Topics    Alcohol use: Never    Drug use: Never       Current Outpatient Medications   Medication Sig Dispense Refill    amLODIPine (NORVASC) 10 MG tablet Take 1 tablet by mouth once daily.      aspirin (ECOTRIN) 81 MG EC tablet Take 1 tablet by mouth every morning.      busPIRone (BUSPAR) 5 MG Tab Take 5 mg by mouth.      cholecalciferol, vitamin D3, 25 mcg (1,000 unit) Chew Take by mouth.      fenofibrate 160 MG Tab Take 1 tablet by mouth once daily.      levoFLOXacin (LEVAQUIN) 500 MG tablet Take 1 tablet (500 mg total) by mouth once daily. 7 tablet 0    metoprolol succinate (TOPROL-XL) 25 MG 24 hr tablet Take 1 tablet by mouth every morning.      olmesartan (BENICAR) 40 MG tablet Take 40 mg by mouth once daily.      estradioL (ESTRACE) 0.01 % (0.1 mg/gram) vaginal cream Place 1 g vaginally once daily for 7 days, THEN 1 g twice a week. 42.5 g 3     No current facility-administered medications for this visit.       Review of patient's allergies indicates:   Allergen Reactions    Sulfa (sulfonamide antibiotics)        Physical Exam  Vitals:    01/06/25 1110   BP: (!) 152/72   Pulse: 61   Resp: 18       General: Well-developed, well-nourished, in no acute distress  HEENT: Normocephalic, atraumatic, extraocular movements intact  Neck: Supple, no supraclavicular or cervical lymphadenopathy, trachea midline  Respirations: even and unlabored  Back: midline spine, No CVA tenderness  Abdomen: soft, Non-tender, non-distended, no palpable masses, no rebound or guarding  : 8/24/24-Normal external female genitalia without lesions. Orthotopic urethral meatus. atrophic vaginal mucosa. Grade 2 Cysotcele, no Rectocele, negative cough stress test, + urethral hypermobility  Extremities: moves all equally, no clubbing, cyanosis, 1+ LE edema  Skin: Warm and dry. No lesions  Psych: normal affect  Neuro: Alert and oriented x 3.  Cranial nerves II-XII intact      Urinalysis  Negative for blood, LE, nit           Assessment:  1. Recurrent UTI  POCT Urinalysis, Dipstick, Automated, W/O Scope      2. Nocturia        3. Genitourinary syndrome of menopause              Plan:   Recurrent UTI  -     POCT Urinalysis, Dipstick, Automated, W/O Scope    Nocturia    Genitourinary syndrome of menopause    Other orders  -     estradioL (ESTRACE) 0.01 % (0.1 mg/gram) vaginal cream; Place 1 g vaginally once daily for 7 days, THEN 1 g twice a week.  Dispense: 42.5 g; Refill: 3    Continue vaginal estrogen  Elevate lower extremities in the early evening  Limit pm fluid  Follow up in about 6 months (around 7/6/2025).

## 2025-04-22 ENCOUNTER — TELEPHONE (OUTPATIENT)
Dept: UROLOGY | Facility: CLINIC | Age: 85
End: 2025-04-22
Payer: MEDICARE

## 2025-04-22 NOTE — TELEPHONE ENCOUNTER
Outgoing call placed to patient's daughter, she was notified of need to reschedule her upcoming appointment due to doctor being out, she verbalized understanding and appointment rescheduled per patient approval.

## 2025-07-21 ENCOUNTER — OFFICE VISIT (OUTPATIENT)
Dept: UROLOGY | Facility: CLINIC | Age: 85
End: 2025-07-21
Payer: MEDICARE

## 2025-07-21 VITALS
SYSTOLIC BLOOD PRESSURE: 116 MMHG | WEIGHT: 127.88 LBS | HEIGHT: 60 IN | HEART RATE: 70 BPM | DIASTOLIC BLOOD PRESSURE: 45 MMHG | BODY MASS INDEX: 25.11 KG/M2

## 2025-07-21 DIAGNOSIS — N39.0 RECURRENT UTI: Primary | ICD-10-CM

## 2025-07-21 DIAGNOSIS — N95.8 GENITOURINARY SYNDROME OF MENOPAUSE: ICD-10-CM

## 2025-07-21 LAB
BILIRUBIN, UA POC OHS: NEGATIVE
BLOOD, UA POC OHS: NEGATIVE
CLARITY, UA POC OHS: CLEAR
COLOR, UA POC OHS: YELLOW
GLUCOSE, UA POC OHS: NEGATIVE
KETONES, UA POC OHS: NEGATIVE
LEUKOCYTES, UA POC OHS: ABNORMAL
NITRITE, UA POC OHS: NEGATIVE
PH, UA POC OHS: 5
PROTEIN, UA POC OHS: NEGATIVE
SPECIFIC GRAVITY, UA POC OHS: 1.01
UROBILINOGEN, UA POC OHS: 0.2

## 2025-07-21 PROCEDURE — 1159F MED LIST DOCD IN RCRD: CPT | Mod: CPTII,S$GLB,, | Performed by: UROLOGY

## 2025-07-21 PROCEDURE — 87086 URINE CULTURE/COLONY COUNT: CPT | Performed by: UROLOGY

## 2025-07-21 PROCEDURE — 1126F AMNT PAIN NOTED NONE PRSNT: CPT | Mod: CPTII,S$GLB,, | Performed by: UROLOGY

## 2025-07-21 PROCEDURE — 81003 URINALYSIS AUTO W/O SCOPE: CPT | Mod: QW,S$GLB,, | Performed by: UROLOGY

## 2025-07-21 PROCEDURE — 99999 PR PBB SHADOW E&M-EST. PATIENT-LVL III: CPT | Mod: PBBFAC,,, | Performed by: UROLOGY

## 2025-07-21 PROCEDURE — 3288F FALL RISK ASSESSMENT DOCD: CPT | Mod: CPTII,S$GLB,, | Performed by: UROLOGY

## 2025-07-21 PROCEDURE — 99214 OFFICE O/P EST MOD 30 MIN: CPT | Mod: S$GLB,,, | Performed by: UROLOGY

## 2025-07-21 PROCEDURE — 1101F PT FALLS ASSESS-DOCD LE1/YR: CPT | Mod: CPTII,S$GLB,, | Performed by: UROLOGY

## 2025-07-21 RX ORDER — FUROSEMIDE 20 MG/1
20 TABLET ORAL 2 TIMES DAILY
COMMUNITY

## 2025-07-21 RX ORDER — POTASSIUM CHLORIDE 1.5 G/1.58G
20 POWDER, FOR SOLUTION ORAL ONCE
COMMUNITY

## 2025-07-21 RX ORDER — ESTRADIOL 0.1 MG/G
CREAM VAGINAL
Qty: 42.5 G | Refills: 3 | Status: SHIPPED | OUTPATIENT
Start: 2025-07-21 | End: 2026-07-23

## 2025-07-21 NOTE — PROGRESS NOTES
CC: follow up recurrent UTI    History of Present Illness:   Windy Mariano is a 85 y.o. female here for follow up.     7/21/25-Pt not using vaginal estrogen because she wasn't having any discomfort. She just got out of the hospital last night for a CHF exacerbation. She was also found to have a UTI and was discharged home on macrobid x 5 days. She reports that she wasn't having any major symptoms of UTI, but occasional dysuria.     1/6/25-Pt completed 3 months of macrobid. She thinks she is doing better. She is having nocturia x 3. No gross hematuria or dysuria. No urge incontinence. Feels like she is emptying. Using vag est infrequently.     9/23/24-Here for cysto. Pt uses estrace cream. Renal US showed simple renal cysts only. Completed levaquin for most recent UTI. She continues to have significant nocturia. No snoring.    9/9/24-Pt completed cefdinir for UTI about 10 days ago. She did have improvement and only had nocturia x 6-7. Symptoms restarted about 3 days ago, she started on azo. Last night, she had nocturia x 13. She reports that prior to starting the azo, she was having dysuria and pressure that she reports radiates up the sides of her abdomen. No fever. Occasional right flank pain. Pt reports constipation. Unsure if she started Miralax. No gross hematuria.     8/24/24-83yo female presents for evaluation of recurrent UTI and nocturia. She reports that she has nocturia x 10-17 times a night. She denies daytime frequency. She reports that she has UTIs about 4 or more times this past year. There are multiple E. Coli cultures on file. Nocturia has also been ongoing for about a year. She hasn't taken any oab meds. She just finished 5 days of macrobid 5 days ago. She does report having some right sided flank pain. She denies snoring.       Review of Systems   Constitutional:  Positive for appetite change. Negative for chills and fever.   Respiratory:  Negative for shortness of breath.    Cardiovascular:   Negative for chest pain and leg swelling.   Neurological:  Positive for weakness.   All other systems reviewed and are negative.        Past Medical History:   Diagnosis Date    Allergy     Decompensated heart failure     Hypertension        Past Surgical History:   Procedure Laterality Date    AORTIC VALVE REPLACEMENT      HYSTERECTOMY         Family History   Problem Relation Name Age of Onset    Hypertension Mother         Social History     Tobacco Use    Smoking status: Never    Smokeless tobacco: Never   Substance Use Topics    Alcohol use: Never    Drug use: Never       Current Outpatient Medications   Medication Sig Dispense Refill    amLODIPine (NORVASC) 10 MG tablet Take 1 tablet by mouth once daily.      aspirin (ECOTRIN) 81 MG EC tablet Take 1 tablet by mouth every morning.      busPIRone (BUSPAR) 5 MG Tab Take 5 mg by mouth.      cholecalciferol, vitamin D3, 25 mcg (1,000 unit) Chew Take by mouth.      fenofibrate 160 MG Tab Take 1 tablet by mouth once daily.      furosemide (LASIX) 20 MG tablet Take 20 mg by mouth 2 (two) times daily.      levoFLOXacin (LEVAQUIN) 500 MG tablet Take 1 tablet (500 mg total) by mouth once daily. 7 tablet 0    metoprolol succinate (TOPROL-XL) 25 MG 24 hr tablet Take 1 tablet by mouth every morning.      potassium chloride (KLOR-CON) 20 mEq Pack Take 20 mEq by mouth once.      estradioL (ESTRACE) 0.01 % (0.1 mg/gram) vaginal cream Place 1 g vaginally once daily for 7 days, THEN 1 g twice a week. 42.5 g 3    olmesartan (BENICAR) 40 MG tablet Take 40 mg by mouth once daily.       No current facility-administered medications for this visit.       Review of patient's allergies indicates:   Allergen Reactions    Sulfa (sulfonamide antibiotics)        Physical Exam  Vitals:    07/21/25 1128   BP: (!) 116/45   Pulse: 70       General: Well-developed, well-nourished, in no acute distress  HEENT: Normocephalic, atraumatic, extraocular movements intact  Neck: Supple, no  supraclavicular or cervical lymphadenopathy, trachea midline  Respirations: even and unlabored  : 8/24/24-Normal external female genitalia without lesions. Orthotopic urethral meatus. atrophic vaginal mucosa. Grade 2 Cysotcele, no Rectocele, negative cough stress test, + urethral hypermobility  Extremities: moves all equally, no clubbing, cyanosis, 1+ LE edema  Skin: Warm and dry. No lesions  Psych: normal affect  Neuro: Alert and oriented x 3. Cranial nerves II-XII intact    PVR: 7mL    Urinalysis  Small LE, otherwise negative     Assessment:  1. Recurrent UTI  POCT Urinalysis(Instrument)    Urine Culture High Risk ($$)      2. Genitourinary syndrome of menopause              Plan:   Recurrent UTI  -     POCT Urinalysis(Instrument)  -     Urine Culture High Risk ($$)    Genitourinary syndrome of menopause    Other orders  -     estradioL (ESTRACE) 0.01 % (0.1 mg/gram) vaginal cream; Place 1 g vaginally once daily for 7 days, THEN 1 g twice a week.  Dispense: 42.5 g; Refill: 3    Pt encouraged to fill macrobid and complete it. No records regarding recent UA/culture at [facility] available for review, but pt likely had antibiotics in the hospital yesterday.   Discussed rationale behind use of Vag Est and pt also encouraged to use it.   Follow up in about 6 months (around 1/21/2026).

## 2025-07-23 LAB — BACTERIA UR CULT: NO GROWTH
